# Patient Record
Sex: MALE | Race: WHITE | NOT HISPANIC OR LATINO | Employment: FULL TIME | URBAN - METROPOLITAN AREA
[De-identification: names, ages, dates, MRNs, and addresses within clinical notes are randomized per-mention and may not be internally consistent; named-entity substitution may affect disease eponyms.]

---

## 2020-06-04 NOTE — PRE-PROCEDURE INSTRUCTIONS
Pre-Surgery Instructions:   Medication Instructions    citalopram (CeleXA) 20 mg tablet Instructed patient per Anesthesia Guidelines  Instructed has no medications to be taken the morning of surgery  No aspirin, NSAIDs, vitamins, or supplements 1 week before surgery

## 2020-06-06 DIAGNOSIS — Z11.59 SCREENING FOR VIRAL DISEASE: ICD-10-CM

## 2020-06-06 PROCEDURE — U0003 INFECTIOUS AGENT DETECTION BY NUCLEIC ACID (DNA OR RNA); SEVERE ACUTE RESPIRATORY SYNDROME CORONAVIRUS 2 (SARS-COV-2) (CORONAVIRUS DISEASE [COVID-19]), AMPLIFIED PROBE TECHNIQUE, MAKING USE OF HIGH THROUGHPUT TECHNOLOGIES AS DESCRIBED BY CMS-2020-01-R: HCPCS

## 2020-06-08 LAB — SARS-COV-2 RNA SPEC QL NAA+PROBE: NOT DETECTED

## 2020-06-10 RX ORDER — ONDANSETRON 2 MG/ML
4 INJECTION INTRAMUSCULAR; INTRAVENOUS ONCE AS NEEDED
Status: CANCELLED | OUTPATIENT
Start: 2020-06-10

## 2020-06-10 RX ORDER — HYDROMORPHONE HCL/PF 1 MG/ML
0.5 SYRINGE (ML) INJECTION
Status: CANCELLED | OUTPATIENT
Start: 2020-06-10

## 2020-06-11 ENCOUNTER — HOSPITAL ENCOUNTER (OUTPATIENT)
Facility: HOSPITAL | Age: 25
Setting detail: OUTPATIENT SURGERY
Discharge: HOME | End: 2020-06-11
Attending: UROLOGY | Admitting: UROLOGY
Payer: COMMERCIAL

## 2020-06-11 VITALS
TEMPERATURE: 97.5 F | RESPIRATION RATE: 18 BRPM | OXYGEN SATURATION: 97 % | HEIGHT: 71 IN | HEART RATE: 81 BPM | DIASTOLIC BLOOD PRESSURE: 81 MMHG | WEIGHT: 170 LBS | BODY MASS INDEX: 23.8 KG/M2 | SYSTOLIC BLOOD PRESSURE: 126 MMHG

## 2020-06-11 DIAGNOSIS — Z11.59 SCREENING FOR VIRAL DISEASE: Primary | ICD-10-CM

## 2020-06-11 RX ORDER — CEFAZOLIN SODIUM 2 G/50ML
2000 SOLUTION INTRAVENOUS ONCE
Status: DISCONTINUED | OUTPATIENT
Start: 2020-06-11 | End: 2020-06-11 | Stop reason: HOSPADM

## 2020-06-11 RX ORDER — SODIUM CHLORIDE 9 MG/ML
125 INJECTION, SOLUTION INTRAVENOUS CONTINUOUS
Status: DISCONTINUED | OUTPATIENT
Start: 2020-06-11 | End: 2020-06-11 | Stop reason: HOSPADM

## 2020-06-11 RX ORDER — SODIUM CHLORIDE 9 MG/ML
150 INJECTION, SOLUTION INTRAVENOUS CONTINUOUS
Status: DISCONTINUED | OUTPATIENT
Start: 2020-06-11 | End: 2020-06-11 | Stop reason: HOSPADM

## 2020-06-11 RX ADMIN — SODIUM CHLORIDE 150 ML/HR: 0.9 INJECTION, SOLUTION INTRAVENOUS at 09:50

## 2020-07-03 DIAGNOSIS — Z11.59 SPECIAL SCREENING EXAMINATION FOR UNSPECIFIED VIRAL DISEASE: Primary | ICD-10-CM

## 2020-07-03 PROCEDURE — U0003 INFECTIOUS AGENT DETECTION BY NUCLEIC ACID (DNA OR RNA); SEVERE ACUTE RESPIRATORY SYNDROME CORONAVIRUS 2 (SARS-COV-2) (CORONAVIRUS DISEASE [COVID-19]), AMPLIFIED PROBE TECHNIQUE, MAKING USE OF HIGH THROUGHPUT TECHNOLOGIES AS DESCRIBED BY CMS-2020-01-R: HCPCS

## 2020-07-06 RX ORDER — CITALOPRAM 40 MG/1
40 TABLET ORAL EVERY EVENING
COMMUNITY

## 2020-07-06 NOTE — PRE-PROCEDURE INSTRUCTIONS
Pre-Surgery Instructions:   Medication Instructions    citalopram (CeleXA) 40 mg tablet Instructed patient per Anesthesia Guidelines  Patient given/ instructed on use of chlorhexidine soap per hospital protocol    Patient instructed to stop all ASA, NSAIDS, vitamins and herbal supplements one week prior to surgery or per Dr Thelma Ley

## 2020-07-08 ENCOUNTER — ANESTHESIA EVENT (OUTPATIENT)
Dept: PERIOP | Facility: HOSPITAL | Age: 25
End: 2020-07-08
Payer: COMMERCIAL

## 2020-07-08 LAB — SARS-COV-2 RNA SPEC QL NAA+PROBE: NOT DETECTED

## 2020-07-09 ENCOUNTER — HOSPITAL ENCOUNTER (OUTPATIENT)
Facility: HOSPITAL | Age: 25
Setting detail: OUTPATIENT SURGERY
Discharge: HOME | End: 2020-07-09
Attending: UROLOGY | Admitting: UROLOGY
Payer: COMMERCIAL

## 2020-07-09 ENCOUNTER — ANESTHESIA (OUTPATIENT)
Dept: PERIOP | Facility: HOSPITAL | Age: 25
End: 2020-07-09
Payer: COMMERCIAL

## 2020-07-09 VITALS
SYSTOLIC BLOOD PRESSURE: 116 MMHG | OXYGEN SATURATION: 100 % | DIASTOLIC BLOOD PRESSURE: 72 MMHG | HEIGHT: 71 IN | TEMPERATURE: 97.5 F | WEIGHT: 170 LBS | HEART RATE: 86 BPM | RESPIRATION RATE: 16 BRPM | BODY MASS INDEX: 23.8 KG/M2

## 2020-07-09 DIAGNOSIS — N47.1 PHIMOSIS: ICD-10-CM

## 2020-07-09 PROCEDURE — 88304 TISSUE EXAM BY PATHOLOGIST: CPT | Performed by: PATHOLOGY

## 2020-07-09 RX ORDER — MAGNESIUM HYDROXIDE 1200 MG/15ML
LIQUID ORAL AS NEEDED
Status: DISCONTINUED | OUTPATIENT
Start: 2020-07-09 | End: 2020-07-09 | Stop reason: HOSPADM

## 2020-07-09 RX ORDER — CEPHALEXIN 500 MG/1
500 CAPSULE ORAL EVERY 6 HOURS SCHEDULED
Qty: 20 CAPSULE | Refills: 0 | Status: SHIPPED | OUTPATIENT
Start: 2020-07-09 | End: 2020-07-14

## 2020-07-09 RX ORDER — ONDANSETRON 2 MG/ML
4 INJECTION INTRAMUSCULAR; INTRAVENOUS EVERY 6 HOURS PRN
Status: DISCONTINUED | OUTPATIENT
Start: 2020-07-09 | End: 2020-07-09 | Stop reason: HOSPADM

## 2020-07-09 RX ORDER — CEFAZOLIN SODIUM 2 G/50ML
2000 SOLUTION INTRAVENOUS ONCE
Status: COMPLETED | OUTPATIENT
Start: 2020-07-09 | End: 2020-07-09

## 2020-07-09 RX ORDER — SODIUM CHLORIDE 9 MG/ML
125 INJECTION, SOLUTION INTRAVENOUS CONTINUOUS
Status: DISCONTINUED | OUTPATIENT
Start: 2020-07-09 | End: 2020-07-09 | Stop reason: HOSPADM

## 2020-07-09 RX ORDER — SODIUM CHLORIDE 9 MG/ML
150 INJECTION, SOLUTION INTRAVENOUS CONTINUOUS
Status: DISCONTINUED | OUTPATIENT
Start: 2020-07-09 | End: 2020-07-09 | Stop reason: HOSPADM

## 2020-07-09 RX ORDER — DEXAMETHASONE SODIUM PHOSPHATE 4 MG/ML
INJECTION, SOLUTION INTRA-ARTICULAR; INTRALESIONAL; INTRAMUSCULAR; INTRAVENOUS; SOFT TISSUE AS NEEDED
Status: DISCONTINUED | OUTPATIENT
Start: 2020-07-09 | End: 2020-07-09 | Stop reason: SURG

## 2020-07-09 RX ORDER — MIDAZOLAM HYDROCHLORIDE 2 MG/2ML
INJECTION, SOLUTION INTRAMUSCULAR; INTRAVENOUS AS NEEDED
Status: DISCONTINUED | OUTPATIENT
Start: 2020-07-09 | End: 2020-07-09 | Stop reason: SURG

## 2020-07-09 RX ORDER — OXYCODONE HYDROCHLORIDE AND ACETAMINOPHEN 5; 325 MG/1; MG/1
1 TABLET ORAL EVERY 4 HOURS PRN
Status: DISCONTINUED | OUTPATIENT
Start: 2020-07-09 | End: 2020-07-09 | Stop reason: HOSPADM

## 2020-07-09 RX ORDER — PROPOFOL 10 MG/ML
INJECTION, EMULSION INTRAVENOUS AS NEEDED
Status: DISCONTINUED | OUTPATIENT
Start: 2020-07-09 | End: 2020-07-09 | Stop reason: SURG

## 2020-07-09 RX ORDER — ACETAMINOPHEN 325 MG/1
650 TABLET ORAL EVERY 6 HOURS PRN
Status: DISCONTINUED | OUTPATIENT
Start: 2020-07-09 | End: 2020-07-09 | Stop reason: HOSPADM

## 2020-07-09 RX ORDER — GINSENG 100 MG
CAPSULE ORAL AS NEEDED
Status: DISCONTINUED | OUTPATIENT
Start: 2020-07-09 | End: 2020-07-09 | Stop reason: HOSPADM

## 2020-07-09 RX ORDER — FENTANYL CITRATE 50 UG/ML
INJECTION, SOLUTION INTRAMUSCULAR; INTRAVENOUS AS NEEDED
Status: DISCONTINUED | OUTPATIENT
Start: 2020-07-09 | End: 2020-07-09 | Stop reason: SURG

## 2020-07-09 RX ORDER — CEPHALEXIN 500 MG/1
500 CAPSULE ORAL ONCE
Status: DISCONTINUED | OUTPATIENT
Start: 2020-07-09 | End: 2020-07-09 | Stop reason: HOSPADM

## 2020-07-09 RX ORDER — OXYCODONE HYDROCHLORIDE AND ACETAMINOPHEN 5; 325 MG/1; MG/1
1 TABLET ORAL EVERY 4 HOURS PRN
Status: DISCONTINUED | OUTPATIENT
Start: 2020-07-09 | End: 2020-07-09

## 2020-07-09 RX ADMIN — PROPOFOL 200 MG: 10 INJECTION, EMULSION INTRAVENOUS at 11:21

## 2020-07-09 RX ADMIN — CEFAZOLIN SODIUM 2000 MG: 2 SOLUTION INTRAVENOUS at 11:11

## 2020-07-09 RX ADMIN — MIDAZOLAM 2 MG: 1 INJECTION INTRAMUSCULAR; INTRAVENOUS at 11:09

## 2020-07-09 RX ADMIN — DEXAMETHASONE SODIUM PHOSPHATE 4 MG: 4 INJECTION, SOLUTION INTRAMUSCULAR; INTRAVENOUS at 11:33

## 2020-07-09 RX ADMIN — SODIUM CHLORIDE: 0.9 INJECTION, SOLUTION INTRAVENOUS at 11:42

## 2020-07-09 RX ADMIN — PHENYLEPHRINE HYDROCHLORIDE 50 MCG: 10 INJECTION INTRAVENOUS at 11:48

## 2020-07-09 RX ADMIN — OXYCODONE HYDROCHLORIDE AND ACETAMINOPHEN 1 TABLET: 5; 325 TABLET ORAL at 13:21

## 2020-07-09 RX ADMIN — FENTANYL CITRATE 50 MCG: 50 INJECTION, SOLUTION INTRAMUSCULAR; INTRAVENOUS at 11:34

## 2020-07-09 RX ADMIN — FENTANYL CITRATE 50 MCG: 50 INJECTION, SOLUTION INTRAMUSCULAR; INTRAVENOUS at 11:09

## 2020-07-09 RX ADMIN — SODIUM CHLORIDE: 0.9 INJECTION, SOLUTION INTRAVENOUS at 11:07

## 2020-07-09 RX ADMIN — LIDOCAINE HYDROCHLORIDE 100 MG: 20 INJECTION, SOLUTION INTRAVENOUS at 11:21

## 2020-07-09 NOTE — INTERVAL H&P NOTE
H&P reviewed  After examining the patient I find no changes in the patients condition since the H&P had been written      Vitals:    07/09/20 0846   BP: 126/70   Pulse: 85   Resp: 18   Temp: 98 5 °F (36 9 °C)   SpO2: 97%

## 2020-07-09 NOTE — OP NOTE
OPERATIVE REPORT    PATIENT NAME: Kerline Sargent    :  1995  MRN: [id number]  Pt Location: [facility] OR ROOM 08    SURGERY DATE:   2020  Surgeon(s) and Role:      DO Benedicto Cabrera Primary    Preop Diagnosis:  Phimosis [N47 1]  Post-Op Diagnosis Codes:      Phimosis [N47 1]  Procedure(s):  CIRCUMCISION ADULT    Specimen  Foreskin    Estimated Blood Loss:   Minimal    Drains:  * No LDAs found *    Anesthesia Type:   General    Operative Indications:  Non retractile phimotic prepuce    Operative Findings:  Phimosis with smegma  Normal glans penis and urethral meatus  Complications:   None known    Procedure and Technique:  Patient was identified  General anesthesia was administered  Genitals were prepped and draped  Time-out was taken  Local anesthetic was instilled circumferentially to accomplish a penile nerve block  Agent utilized was a 50 50 mixture of 1% xylocaine and 0 5% Marcaine  8 cc of the anesthetic mixture was utilized  Foreskin was grasped at the 3:00 o'clock and 9:00 o'clock positions with curved hemostats  At the 12 o'clock position a crushing hemostatic clamp was utilized to initiate the dorsal incision  Incision was then made at the 12 o'clock position and extended to the level of the glans penis  Allis clamps were then applied bilaterally on either side of the incision  Identical procedure was repeated ventrally at the 6 o'clock position utilizing Allis clamps as well  Redundant foreskin was then excised from 6:00 to 12:00 on the right side, and then from 12:00 to 6:00 o'clock on the right side  Excised foreskin was then submitted as specimen for pathologic review  Hemostasis was then achieved with electrocautery  Mucocutaneous approximation of of remaining tissue was then done with interrupted sutures of 3 0 Monocryl  Excellent result was accomplished  Genitals were then cleansed, dried, dressed, uterus utilizing antibiotic ointment, Adaptic  And sterile circumferential dressing  Dressing was then held in place with tube gauze  Patient went to recovery room in good postoperative condition having tolerated the procedure well  He is to follow up at the office for recheck       I was present for the entire procedure    Patient Disposition:  PACU     SIGNATURE: Joanie Ruvalcaba,   DATE: July 9, 2020  TIME: 12:47 PM

## 2020-07-09 NOTE — DISCHARGE INSTRUCTIONS
Adult Male Circumcision   WHAT YOU NEED TO KNOW:   Circumcision is surgery to remove the foreskin of the penis  The foreskin is the fold of skin that covers the tip of the penis  DISCHARGE INSTRUCTIONS:   Medicines:   · Pain medicine: You may need medicine to take away or decrease pain  ¨ Learn how to take your medicine  Ask what medicine and how much you should take  Be sure you know how, when, and how often to take it  ¨ Do not wait until the pain is severe before you take your medicine  Tell caregivers if your pain does not decrease  ¨ Pain medicine can make you dizzy or sleepy  Prevent falls by calling someone when you get out of bed or if you need help  · Antibiotics: This medicine is given to fight or prevent an infection caused by bacteria  Always take your antibiotics exactly as ordered by your healthcare provider  Do not stop taking your medicine unless directed by your healthcare provider  Never save antibiotics or take leftover antibiotics that were given to you for another illness  · Take your medicine as directed  Contact your healthcare provider if you think your medicine is not helping or if you have side effects  Tell him or her if you are allergic to any medicine  Keep a list of the medicines, vitamins, and herbs you take  Include the amounts, and when and why you take them  Bring the list or the pill bottles to follow-up visits  Carry your medicine list with you in case of an emergency  Follow up with your healthcare provider as directed: You may need to return to have your stitches removed  Write down your questions so you remember to ask them during your visits  Sexual activity:  You may need to wait 4 to 6 weeks after the procedure before sexual activity  Ask your healthcare provider before you engage in sexual activity  Rest when you need to while you heal after surgery  Slowly start to do more each day  Return to your daily activities as directed  Self-care:   · Bathing and wound care: The bandages may be removed 24 to 48 hours after the procedure  When you are allowed to shower, carefully wash the incision with soap and water every day  Do not take a tub bath or get in a hot tub until your healthcare provider says it is okay  · Clothing:  Do not wear tight-fitting briefs, shorts, or pants  Contact your healthcare provider if:   · You have a fever  · You have pain when you urinate  · You have chills, a cough, or feel weak and achy  · You have questions or concerns about your procedure, condition, or care  Seek care immediately or call 911 if:   · Your urine has blood in it, becomes very cloudy, and smells bad  · You cannot urinate  · You have pain or swelling of the penis that does not go away, even with medicine  · Your incision is red, swollen, painful, and leaking pus  © 2017 [facility] Information is for End User's use only and may not be sold, redistributed or otherwise used for commercial purposes  All illustrations and images included in CareNotes® are the copyrighted property of [employer]  or [employer] M [employer]  The above information is an  only  It is not intended as medical advice for individual conditions or treatments  Talk to your doctor, nurse or pharmacist before following any medical regimen to see if it is safe and effective for you  Adult Male Circumcision   WHAT YOU NEED TO KNOW:   Circumcision is surgery to remove the foreskin of the penis  The foreskin is the fold of skin that covers the tip of the penis  WHILE YOU ARE HERE:   Before your procedure:  · Informed consent  is a legal document that explains the tests, treatments, or procedures that you may need  Informed consent means you understand what will be done and can make decisions about what you want  You give your permission when you sign the consent form   You can have someone sign this form for you if you are not able to sign it  You have the right to understand your medical care in words you know  Before you sign the consent form, understand the risks and benefits of what will be done  Make sure all your questions are answered  · Gown: A hospital gown is used so that caregivers can easily check and treat you  Caregivers will show you how to put on your gown  When you feel better you may be able to wear your own gown or pajamas  · An IV  is a small tube placed in your vein that is used to give you medicine or liquids  · Pre-op care: You may be given medicine right before your procedure or surgery  This medicine may make you feel relaxed and sleepy  You are taken on a stretcher to the room where your procedure or surgery will be done, and then you are moved to a table or bed  · Medicines:     ¨ Antianxiety medicine: This medicine may be given to decrease anxiety and help you feel calm and relaxed  ¨ Topical numbing cream: A numbing cream may be applied to the skin of the penis 30 to 60 minutes before the procedure  This may decrease the pain when anesthesia is injected during the procedure  · Monitoring:     ¨ Heart monitor: This is also called an ECG or EKG  Sticky pads placed on your skin record your heart's electrical activity  ¨ A pulse oximeter  is a device that measures the amount of oxygen in your blood  A cord with a clip or sticky strip is placed on your finger, ear, or toe  The other end of the cord is hooked to a machine  ¨ Vital signs:  Caregivers will check your blood pressure, heart rate, breathing rate, and temperature  They will also ask about your pain  These vital signs give caregivers information about your current health  · Anesthesia: This medicine is given to make you comfortable  You may not feel discomfort, pressure, or pain  An adult will need to drive you home and should stay with you for 24 hours   Ask your caregiver if you can drive or use machinery within 24 hours  Also ask if and when you can drink alcohol or use over-the-counter medicine  You may not want to make important decisions until 24 hours have passed  · If you are given general anesthesia, an endotracheal tube (ET) connected to a breathing machine may be put into your mouth or nose  The tube is used to keep your airway open and help you breathe during your surgery  During your procedure:  · Soap, water, and antiseptics (germ-killing liquids) are used to clean your abdomen and genital area  Sheets are put over you to keep the procedure area clean  · During a dorsal slit technique , your healthcare provider grasps the foreskin with forceps  He makes an incision on the top part of the foreskin  Scissors or a special knife are used to make an incision  After making the slit, the foreskin is pulled back to expose the glans  Your healthcare provider cuts off the foreskin and uses pressure or electrocautery (electric current) to control any bleeding  Your incision is closed with stitches or an adhesive (glue-like substance)  A bandage with petroleum jelly on it is placed over the incision  · In a sleeve technique , a line is drawn around the base of the foreskin  This line serves as a marker where the incisions are made  It is also used to measure the correct amount of foreskin to be removed  Your healthcare provider makes two cuts around the base of the foreskin and the inside of the foreskin  This releases a sleeve (tube) of foreskin, which is removed by pulling it over the glans  The cut edges of the ring-like gap that is left are filled in by pulling up the remaining foreskin  Pressure or electrocautery (electric current) may be used to control any bleeding  Your incision is closed with stitches or an adhesive (glue-like substance)  A bandage with petroleum jelly on it is placed over the incision  After your procedure:  You may lie in bed and rest for a while since the procedure may be tiring  Healthcare providers will watch you closely for any problems  Do not get out of bed until your healthcare provider says it is OK  When your healthcare provider sees that you are OK, you will be allowed to change clothes and go home  If your healthcare provider wants you to stay in the hospital, you will be taken back to your hospital room  The bandages used to cover your stitches help keep the area clean and dry to prevent infection  A healthcare provider may remove the bandages soon after your procedure to check your wounds  Ask your healthcare provider for information on how to take care of your wound  · Activity:  You may need to walk around the same day of surgery, or the day after  Movement will help prevent blood clots  You may also be given exercises to do in bed  Do not get out of bed on your own until your caregiver says you can  Talk to caregivers before you get up the first time  They may need to help you stand up safely  When you are able to get up on your own, sit or lie down right away if you feel weak or dizzy  Then press the call light button to let caregivers know you need help  · You will be able to drink liquids and eat certain foods  once your stomach function returns after surgery  You may be given ice chips at first  Then you will get liquids such as water, broth, juice, and clear soft drinks  If your stomach does not become upset, you may then be given soft foods, such as ice cream and applesauce  Once you can eat soft foods easily, you may slowly begin to eat solid foods  · Medicines: You may need any of the following:    ¨ Antibiotics: This medicine is given to help treat or prevent an infection caused by bacteria  ¨ Antinausea medicine: This medicine may be given to calm your stomach and to help prevent vomiting  ¨ Pain medicine:    Caregivers may give you medicine to take away or decrease your pain       § Do not wait until the pain is severe to ask for your medicine  Tell caregivers if your pain does not decrease  The medicine may not work as well at controlling your pain if you wait too long to take it  § Pain medicine can make you dizzy or sleepy  Prevent falls by calling a caregiver when you want to get out of bed or if you need help  · Monitoring: Healthcare providers may check for a pulse in your arms, wrists, legs, or feet  This helps healthcare providers learn if you have problems with blood flow after your procedure  RISKS:   · Adult male circumcision is usually a common and safe procedure, though there are always risks with this procedure  Your penis, prostate, other parts of the urinary tract, blood vessels, or nerves may get injured during the procedure  This may cause you to have problems when passing urine or having sex  You may also have swelling, pelvic pain, or a numb feeling in your penis  You may bleed more than usual or get an infection  You may have an erection before you have completely healed  This may cause your sutures or the adhesive to break and your incision to open up  You may need more surgery if this happens  Your penis may not have as much feeling as it did before  It also may not look the way you expected it to look like after the procedure  · Without this procedure, the conditions affecting your penis may continue and your symptoms may get worse  You may have serious medical problems, such as a severe infection or an increased risk of getting a STD, HIV, or penile cancer  Ask your healthcare provider if you are worried or have questions about your procedure, condition, or care  CARE AGREEMENT:   You have the right to help plan your care  Learn about your health condition and how it may be treated  Discuss treatment options with your caregivers to decide what care you want to receive  You always have the right to refuse treatment     © 2017 [facility] Information is for End User's use only and may not be sold, redistributed or otherwise used for commercial purposes  All illustrations and images included in CareNotes® are the copyrighted property of [employer] Inc  or [employer] M [employer]ila  The above information is an  only  It is not intended as medical advice for individual conditions or treatments  Talk to your doctor, nurse or pharmacist before following any medical regimen to see if it is safe and effective for you

## 2020-07-09 NOTE — ANESTHESIA PREPROCEDURE EVALUATION
Review of Systems/Medical History  Patient summary reviewed  Chart reviewed  No history of anesthetic complications     Cardiovascular  Negative cardio ROS    Pulmonary  Negative pulmonary ROS        GI/Hepatic  Negative GI/hepatic ROS          Negative  ROS        Endo/Other  Negative endo/other ROS      GYN  Negative gynecology ROS          Hematology  Negative hematology ROS      Musculoskeletal  Negative musculoskeletal ROS        Neurology  Negative neurology ROS      Psychology   Anxiety,              Physical Exam    Airway    Mallampati score: II  TM Distance: >3 FB  Neck ROM: full     Dental   No notable dental hx     Cardiovascular  Comment: Negative ROS, Rhythm: regular, Rate: normal, Cardiovascular exam normal    Pulmonary  Pulmonary exam normal Breath sounds clear to auscultation,     Other Findings        Anesthesia Plan  ASA Score- 2     Anesthesia Type- general with ASA Monitors  Additional Monitors:   Airway Plan:         Plan Factors-Patient not instructed to abstain from smoking on day of procedure  Patient did not smoke on day of surgery  Induction- intravenous  Postoperative Plan-     Informed Consent- Anesthetic plan and risks discussed with patient

## (undated) DEVICE — CURITY NON-ADHERENT STRIPS: Brand: CURITY

## (undated) DEVICE — CAUTERY TIP POLISHER: Brand: DEVON

## (undated) DEVICE — SCD SEQUENTIAL COMPRESSION COMFORT SLEEVE MEDIUM KNEE LENGTH: Brand: KENDALL SCD

## (undated) DEVICE — INTENDED FOR TISSUE SEPARATION, AND OTHER PROCEDURES THAT REQUIRE A SHARP SURGICAL BLADE TO PUNCTURE OR CUT.: Brand: BARD-PARKER SAFETY BLADES SIZE 15, STERILE

## (undated) DEVICE — SUT MONOCRYL 3-0 PS-2 27 IN Y427H

## (undated) DEVICE — SPONGE STICK WITH PVP-I: Brand: KENDALL

## (undated) DEVICE — GLOVE SRG BIOGEL 7

## (undated) DEVICE — KERLIX BANDAGE ROLL: Brand: KERLIX

## (undated) DEVICE — GAUZE SPONGES,16 PLY: Brand: CURITY

## (undated) DEVICE — PENCIL ELECTROSURG E-Z CLEAN -0035H

## (undated) DEVICE — STRETCH BANDAGE: Brand: CURITY

## (undated) DEVICE — ELECTRODE NEEDLE MOD E-Z CLEAN 2.75IN 7CM -0013M

## (undated) DEVICE — BETHLEHEM UNIVERSAL MINOR GEN: Brand: CARDINAL HEALTH

## (undated) DEVICE — NEEDLE 25G X 1 1/2

## (undated) DEVICE — DRAPE EQUIPMENT RF WAND